# Patient Record
Sex: FEMALE | Race: AMERICAN INDIAN OR ALASKA NATIVE
[De-identification: names, ages, dates, MRNs, and addresses within clinical notes are randomized per-mention and may not be internally consistent; named-entity substitution may affect disease eponyms.]

---

## 2022-01-12 ENCOUNTER — HOSPITAL ENCOUNTER (OUTPATIENT)
Dept: HOSPITAL 5 - SPVWC | Age: 52
Discharge: HOME | End: 2022-01-12
Attending: INTERNAL MEDICINE
Payer: COMMERCIAL

## 2022-01-12 DIAGNOSIS — R92.8: ICD-10-CM

## 2022-01-12 DIAGNOSIS — N63.20: ICD-10-CM

## 2022-01-12 DIAGNOSIS — N63.10: Primary | ICD-10-CM

## 2022-01-12 PROCEDURE — 77066 DX MAMMO INCL CAD BI: CPT

## 2022-01-13 NOTE — MAMMOGRAPHY REPORT
BILATERAL DIGITAL DIAGNOSTIC MAMMOGRAM WITH CAD CONVENTIONAL, 1/12/2022

BILATERAL LIMITED BREAST ULTRASOUND

 

CLINICAL INFORMATION / INDICATION: The patient reports generalized areas of lumpiness in both breasts
. She is unable to identify a focal area of palpable concern in either breast.



TECHNIQUE: Digital bilateral mammographic imaging was performed. Limited ultrasound was performed. Th
is examination was interpreted with the benefit of Computer-Aided Detection (CAD) analysis. 



COMPARISON: Bilateral mammogram, 7/2/2019



FINDINGS: 



Breast Density: There are scattered areas of fibroglandular density.



MAMMOGRAPHIC FINDINGS: No dominant mass, suspicious calcifications, or architectural distortion in ei
ther breast. Post-surgical changes from bilateral breast reduction are again noted. There is no focal
 mammographic abnormality to account for the patient's report of generalized breast lumpiness.



ULTRASOUND FINDINGS: Targeted ultrasound evaluation was performed of the area of interest.   



Right breast: Sonographic evaluation of the right breast at the 12:00 position in the general area of
 patient's clinical concern demonstrates no suspicious solid mass or shadowing.



Left breast: Sonographic evaluation of the left breast at the 3:00 position in the patient's area of 
pain and lumpiness also demonstrates no suspicious solid mass or shadowing.





IMPRESSION: No mammographic or sonographic evidence of malignancy. Clinical correlation is recommende
d for the patient's reported generalized breast lumpiness.



Follow up recommendation: Clinical exam



BI-RADS Category 2:  BENIGN.



-------------------------------------------------------------------------------------------

A "normal" or negative report should not discourage follow up or biopsy of a clinically significant f
inding.



A written summary of these findings will be mailed to the patient. The patient will be entered into a
 mammography reporting system which will generate a reminder letter for the patient's next appointmen
t at the appropriate interval.



According to the [employer], yearly mammograms are recommended starting at age 40 
and continuing as long as a woman is in good health.  Breast MRI is recommended for women with an trent
roximately 20-25% or greater lifetime risk of breast cancer, including women with a strong family his
tory of breast or ovarian cancer and women who have been treated for Hodgkin's disease.



Signer Name: Hazel Adams MD 

Signed: 1/13/2022 5:59 PM

Workstation Name: [id number]